# Patient Record
Sex: MALE | Race: WHITE | Employment: OTHER | URBAN - METROPOLITAN AREA
[De-identification: names, ages, dates, MRNs, and addresses within clinical notes are randomized per-mention and may not be internally consistent; named-entity substitution may affect disease eponyms.]

---

## 2019-11-29 ENCOUNTER — OFFICE VISIT (OUTPATIENT)
Dept: FAMILY MEDICINE CLINIC | Age: 59
End: 2019-11-29
Payer: COMMERCIAL

## 2019-11-29 VITALS
WEIGHT: 159 LBS | OXYGEN SATURATION: 97 % | BODY MASS INDEX: 24.1 KG/M2 | SYSTOLIC BLOOD PRESSURE: 118 MMHG | DIASTOLIC BLOOD PRESSURE: 76 MMHG | HEART RATE: 68 BPM | HEIGHT: 68 IN | TEMPERATURE: 98.6 F

## 2019-11-29 DIAGNOSIS — Z23 NEEDS FLU SHOT: ICD-10-CM

## 2019-11-29 DIAGNOSIS — J30.1 SEASONAL ALLERGIC RHINITIS DUE TO POLLEN: ICD-10-CM

## 2019-11-29 DIAGNOSIS — R20.8 SKIN PAIN: ICD-10-CM

## 2019-11-29 DIAGNOSIS — A63.0 GENITAL WARTS: ICD-10-CM

## 2019-11-29 DIAGNOSIS — Z12.5 SCREENING PSA (PROSTATE SPECIFIC ANTIGEN): ICD-10-CM

## 2019-11-29 DIAGNOSIS — K21.9 GASTROESOPHAGEAL REFLUX DISEASE WITHOUT ESOPHAGITIS: Primary | ICD-10-CM

## 2019-11-29 DIAGNOSIS — Z13.220 LIPID SCREENING: ICD-10-CM

## 2019-11-29 LAB
A/G RATIO: 1.9 (ref 1.1–2.2)
ALBUMIN SERPL-MCNC: 4.6 G/DL (ref 3.4–5)
ALP BLD-CCNC: 70 U/L (ref 40–129)
ALT SERPL-CCNC: 39 U/L (ref 10–40)
ANION GAP SERPL CALCULATED.3IONS-SCNC: 12 MMOL/L (ref 3–16)
AST SERPL-CCNC: 23 U/L (ref 15–37)
BILIRUB SERPL-MCNC: 1.2 MG/DL (ref 0–1)
BUN BLDV-MCNC: 22 MG/DL (ref 7–20)
CALCIUM SERPL-MCNC: 9.4 MG/DL (ref 8.3–10.6)
CHLORIDE BLD-SCNC: 99 MMOL/L (ref 99–110)
CHOLESTEROL, TOTAL: 185 MG/DL (ref 0–199)
CO2: 29 MMOL/L (ref 21–32)
CREAT SERPL-MCNC: 1.1 MG/DL (ref 0.9–1.3)
GFR AFRICAN AMERICAN: >60
GFR NON-AFRICAN AMERICAN: >60
GLOBULIN: 2.4 G/DL
GLUCOSE BLD-MCNC: 99 MG/DL (ref 70–99)
HDLC SERPL-MCNC: 50 MG/DL (ref 40–60)
LDL CHOLESTEROL CALCULATED: 91 MG/DL
POTASSIUM SERPL-SCNC: 4.5 MMOL/L (ref 3.5–5.1)
PROSTATE SPECIFIC ANTIGEN: 3.4 NG/ML (ref 0–4)
SODIUM BLD-SCNC: 140 MMOL/L (ref 136–145)
TOTAL PROTEIN: 7 G/DL (ref 6.4–8.2)
TRIGL SERPL-MCNC: 218 MG/DL (ref 0–150)
VLDLC SERPL CALC-MCNC: 44 MG/DL

## 2019-11-29 PROCEDURE — 17110 DESTRUCTION B9 LES UP TO 14: CPT | Performed by: FAMILY MEDICINE

## 2019-11-29 PROCEDURE — 99214 OFFICE O/P EST MOD 30 MIN: CPT | Performed by: FAMILY MEDICINE

## 2019-11-29 PROCEDURE — 36415 COLL VENOUS BLD VENIPUNCTURE: CPT | Performed by: FAMILY MEDICINE

## 2019-11-29 PROCEDURE — 90471 IMMUNIZATION ADMIN: CPT | Performed by: FAMILY MEDICINE

## 2019-11-29 PROCEDURE — 90688 IIV4 VACCINE SPLT 0.5 ML IM: CPT | Performed by: FAMILY MEDICINE

## 2019-11-29 ASSESSMENT — PATIENT HEALTH QUESTIONNAIRE - PHQ9
1. LITTLE INTEREST OR PLEASURE IN DOING THINGS: 0
SUM OF ALL RESPONSES TO PHQ9 QUESTIONS 1 & 2: 0
SUM OF ALL RESPONSES TO PHQ QUESTIONS 1-9: 0
2. FEELING DOWN, DEPRESSED OR HOPELESS: 0
SUM OF ALL RESPONSES TO PHQ QUESTIONS 1-9: 0

## 2019-12-05 ENCOUNTER — TELEPHONE (OUTPATIENT)
Dept: FAMILY MEDICINE CLINIC | Age: 59
End: 2019-12-05

## 2019-12-05 DIAGNOSIS — L98.9 SKIN LESION OF BACK: ICD-10-CM

## 2019-12-05 DIAGNOSIS — L98.9 SKIN LESION OF SCALP: Primary | ICD-10-CM

## 2019-12-06 ENCOUNTER — OFFICE VISIT (OUTPATIENT)
Dept: DERMATOLOGY | Age: 59
End: 2019-12-06
Payer: COMMERCIAL

## 2019-12-06 DIAGNOSIS — L57.8 PHOTOAGING OF SKIN: ICD-10-CM

## 2019-12-06 DIAGNOSIS — R23.8 VENOUS LAKE OF LIP: ICD-10-CM

## 2019-12-06 DIAGNOSIS — L57.0 ACTINIC KERATOSES: Primary | ICD-10-CM

## 2019-12-06 DIAGNOSIS — L82.1 SEBORRHEIC KERATOSIS: ICD-10-CM

## 2019-12-06 DIAGNOSIS — D22.9 MULTIPLE BENIGN MELANOCYTIC NEVI: ICD-10-CM

## 2019-12-06 PROCEDURE — 17000 DESTRUCT PREMALG LESION: CPT | Performed by: DERMATOLOGY

## 2019-12-06 PROCEDURE — 17003 DESTRUCT PREMALG LES 2-14: CPT | Performed by: DERMATOLOGY

## 2019-12-06 PROCEDURE — 99203 OFFICE O/P NEW LOW 30 MIN: CPT | Performed by: DERMATOLOGY

## 2021-11-30 RX ORDER — MELOXICAM 15 MG/1
TABLET ORAL
Qty: 30 TABLET | Refills: 0 | Status: SHIPPED | OUTPATIENT
Start: 2021-11-30 | End: 2021-12-14 | Stop reason: SDUPTHER

## 2021-11-30 NOTE — TELEPHONE ENCOUNTER
Patient called and he is requesting Meloxicam for his arthritis. It has been a while since he has had it.  He is scheduled to see you on 12-14-21

## 2021-12-14 ENCOUNTER — OFFICE VISIT (OUTPATIENT)
Dept: FAMILY MEDICINE CLINIC | Age: 61
End: 2021-12-14
Payer: COMMERCIAL

## 2021-12-14 VITALS
DIASTOLIC BLOOD PRESSURE: 80 MMHG | HEIGHT: 68 IN | HEART RATE: 75 BPM | WEIGHT: 171 LBS | OXYGEN SATURATION: 96 % | SYSTOLIC BLOOD PRESSURE: 119 MMHG | BODY MASS INDEX: 25.91 KG/M2

## 2021-12-14 DIAGNOSIS — Z23 NEEDS FLU SHOT: ICD-10-CM

## 2021-12-14 DIAGNOSIS — Z12.5 SCREENING PSA (PROSTATE SPECIFIC ANTIGEN): ICD-10-CM

## 2021-12-14 DIAGNOSIS — Z13.220 LIPID SCREENING: ICD-10-CM

## 2021-12-14 DIAGNOSIS — M19.041 PRIMARY OSTEOARTHRITIS OF BOTH HANDS: ICD-10-CM

## 2021-12-14 DIAGNOSIS — K21.9 GASTROESOPHAGEAL REFLUX DISEASE WITHOUT ESOPHAGITIS: Primary | ICD-10-CM

## 2021-12-14 DIAGNOSIS — M19.042 PRIMARY OSTEOARTHRITIS OF BOTH HANDS: ICD-10-CM

## 2021-12-14 PROCEDURE — 99214 OFFICE O/P EST MOD 30 MIN: CPT | Performed by: FAMILY MEDICINE

## 2021-12-14 PROCEDURE — 90471 IMMUNIZATION ADMIN: CPT | Performed by: FAMILY MEDICINE

## 2021-12-14 PROCEDURE — 90674 CCIIV4 VAC NO PRSV 0.5 ML IM: CPT | Performed by: FAMILY MEDICINE

## 2021-12-14 PROCEDURE — 36415 COLL VENOUS BLD VENIPUNCTURE: CPT | Performed by: FAMILY MEDICINE

## 2021-12-14 RX ORDER — MELOXICAM 15 MG/1
TABLET ORAL
Qty: 90 TABLET | Refills: 1 | Status: SHIPPED | OUTPATIENT
Start: 2021-12-14

## 2021-12-14 ASSESSMENT — PATIENT HEALTH QUESTIONNAIRE - PHQ9
SUM OF ALL RESPONSES TO PHQ QUESTIONS 1-9: 0
2. FEELING DOWN, DEPRESSED OR HOPELESS: 0
SUM OF ALL RESPONSES TO PHQ9 QUESTIONS 1 & 2: 0
SUM OF ALL RESPONSES TO PHQ QUESTIONS 1-9: 0
SUM OF ALL RESPONSES TO PHQ QUESTIONS 1-9: 0
1. LITTLE INTEREST OR PLEASURE IN DOING THINGS: 0

## 2021-12-14 NOTE — PROGRESS NOTES
Vaccine Information Sheet, \"Influenza - Inactivated\"  given to Shahrzad Champagne, or parent/legal guardian of  Shahrzad Champagne and verbalized understanding. Patient responses:    Have you ever had a reaction to a flu vaccine? No  Do you have any current illness? No  Have you ever had Gu[place] Syndrome? No  Do you have a serious allergy to any of the follow: Neomycin, Polymyxin, Thimerosal, eggs or egg products? No    Flu vaccine given per order. Please see immunization tab. Risks and benefits explained. Current VIS given.

## 2021-12-14 NOTE — PATIENT INSTRUCTIONS
Please read the healthy family handout that you were given and share it with your family. Please compare this printed medication list with your medications at home to be sure they are the same. If you have any medications that are different please contact us immediately at [phone]. Also review your allergies that we have listed, these may also include medications that you have not been able to tolerate, make sure everything listed is correct. If you have any allergies that are different please contact us immediately at [phone].

## 2021-12-14 NOTE — PROGRESS NOTES
He presents for follow up of his GERD and he has been having some arthritis type pains in his knees and hand joints. He states it comes and goes. He had some leftover meloxicam so he tried that and it worked great he does not take it every day just as needed he occasionally will take ibuprofen but not on the same day as meloxicam.  He continues to use over-the-counter Nexium and it works well for his GERD. He is due for blood work he states  Tests and documents reviewed: Medication list     Objective:   /80   Pulse 75   Ht 5' 7.5\" (1.715 m)   Wt 171 lb (77.6 kg)   SpO2 96%   BMI 26.39 kg/m²   BP Readings from Last 3 Encounters:   12/14/21 119/80   11/29/19 118/76   04/23/19 131/86     Physical Exam  Vitals reviewed. Constitutional:       Appearance: He is well-developed. He is not toxic-appearing. HENT:      Head: Normocephalic. Neck:      Thyroid: No thyroid mass or thyromegaly. Cardiovascular:      Rate and Rhythm: Normal rate and regular rhythm. Heart sounds: Normal heart sounds. No murmur heard. Pulmonary:      Effort: No respiratory distress. Breath sounds: Normal breath sounds. No wheezing or rales. Chest:   Breasts:      Right: No supraclavicular adenopathy. Abdominal:      General: There is no distension. Palpations: Abdomen is soft. There is no mass. Tenderness: There is no abdominal tenderness. There is no guarding or rebound. Musculoskeletal:      Cervical back: Neck supple. Lymphadenopathy:      Cervical: No cervical adenopathy. Upper Body:      Right upper body: No supraclavicular adenopathy. Skin:     General: Skin is warm. Neurological:      Mental Status: He is alert and oriented to person, place, and time. Psychiatric:         Behavior: Behavior normal.         Thought Content: Thought content normal.         Judgment: Judgment normal.       Assessment and Plan:   Diagnosis Orders   1.  Gastroesophageal reflux disease without esophagitis  Comprehensive Metabolic Panel   2. Primary osteoarthritis of both hands  meloxicam (MOBIC) 15 MG tablet    CBC Auto Differential    Sedimentation Rate   3. Lipid screening  Lipid Panel   4. Screening PSA (prostate specific antigen)  PSA screening   5. Needs flu shot  INFLUENZA, MDCK QUADV, 2 YRS AND OLDER, IM, PF, PREFILL SYR OR SDV, 0.5ML (FLUCELVAX QUADV, PF)   Use meloxicam as needed and avoid over-the-counter NSAIDs when taking it. They on Nexium since controlling reflux. He is attempted to go off of it and had rebound symptoms. We discussed Covid vaccination which she is already had. The problems listed in the assessment are stable unless otherwise indicated. He  was instructed to continue their current medications and treatment for the above problems unless otherwise indicated above. Age-specific preventative medicine recommendations were reviewed with patient today and the Healthy Family Handout was given to patient. Avoid tobacco products exposure. I have recommended that the patient follow CDC guidelines for prevention of COVID-19 infection. Follow up in 1 year. Call or return to office for any problems that develop before the next scheduled follow-up appointment. Saw Whittaker M.D. Parts of this note were completed using voice recognition transcription. Every effort was made to ensure accuracy; however, inadvertent transcription errors may be present.

## 2021-12-15 LAB
A/G RATIO: 1.9 (ref 1.1–2.2)
ALBUMIN SERPL-MCNC: 4.6 G/DL (ref 3.4–5)
ALP BLD-CCNC: 79 U/L (ref 40–129)
ALT SERPL-CCNC: 25 U/L (ref 10–40)
ANION GAP SERPL CALCULATED.3IONS-SCNC: 12 MMOL/L (ref 3–16)
AST SERPL-CCNC: 22 U/L (ref 15–37)
BASOPHILS ABSOLUTE: 0 K/UL (ref 0–0.2)
BASOPHILS RELATIVE PERCENT: 0.5 %
BILIRUB SERPL-MCNC: 1.3 MG/DL (ref 0–1)
BUN BLDV-MCNC: 25 MG/DL (ref 7–20)
CALCIUM SERPL-MCNC: 9 MG/DL (ref 8.3–10.6)
CHLORIDE BLD-SCNC: 101 MMOL/L (ref 99–110)
CHOLESTEROL, TOTAL: 193 MG/DL (ref 0–199)
CO2: 27 MMOL/L (ref 21–32)
CREAT SERPL-MCNC: 1.1 MG/DL (ref 0.8–1.3)
EOSINOPHILS ABSOLUTE: 0.2 K/UL (ref 0–0.6)
EOSINOPHILS RELATIVE PERCENT: 3.2 %
GFR AFRICAN AMERICAN: >60
GFR NON-AFRICAN AMERICAN: >60
GLUCOSE BLD-MCNC: 91 MG/DL (ref 70–99)
HCT VFR BLD CALC: 46.8 % (ref 40.5–52.5)
HDLC SERPL-MCNC: 44 MG/DL (ref 40–60)
HEMOGLOBIN: 16.3 G/DL (ref 13.5–17.5)
LDL CHOLESTEROL CALCULATED: 115 MG/DL
LYMPHOCYTES ABSOLUTE: 1.4 K/UL (ref 1–5.1)
LYMPHOCYTES RELATIVE PERCENT: 27.5 %
MCH RBC QN AUTO: 33.5 PG (ref 26–34)
MCHC RBC AUTO-ENTMCNC: 34.9 G/DL (ref 31–36)
MCV RBC AUTO: 96 FL (ref 80–100)
MONOCYTES ABSOLUTE: 0.4 K/UL (ref 0–1.3)
MONOCYTES RELATIVE PERCENT: 8.3 %
NEUTROPHILS ABSOLUTE: 3.2 K/UL (ref 1.7–7.7)
NEUTROPHILS RELATIVE PERCENT: 60.5 %
PDW BLD-RTO: 13.9 % (ref 12.4–15.4)
PLATELET # BLD: 182 K/UL (ref 135–450)
PMV BLD AUTO: 7 FL (ref 5–10.5)
POTASSIUM SERPL-SCNC: 4.5 MMOL/L (ref 3.5–5.1)
PROSTATE SPECIFIC ANTIGEN: 2.62 NG/ML (ref 0–4)
RBC # BLD: 4.87 M/UL (ref 4.2–5.9)
SEDIMENTATION RATE, ERYTHROCYTE: 3 MM/HR (ref 0–20)
SODIUM BLD-SCNC: 140 MMOL/L (ref 136–145)
TOTAL PROTEIN: 7 G/DL (ref 6.4–8.2)
TRIGL SERPL-MCNC: 170 MG/DL (ref 0–150)
VLDLC SERPL CALC-MCNC: 34 MG/DL
WBC # BLD: 5.3 K/UL (ref 4–11)

## 2023-02-20 ENCOUNTER — TELEPHONE (OUTPATIENT)
Dept: FAMILY MEDICINE CLINIC | Age: 63
End: 2023-02-20

## 2023-02-20 DIAGNOSIS — L98.9 SCALP LESION: Primary | ICD-10-CM

## 2023-02-20 NOTE — TELEPHONE ENCOUNTER
Wife calling for patient, she said he has a lesion on his scalp and she is wanting him to see dermatology, but need referral   Ok to send referral or do you need to see pt first?

## 2023-04-19 DIAGNOSIS — M19.041 PRIMARY OSTEOARTHRITIS OF BOTH HANDS: ICD-10-CM

## 2023-04-19 DIAGNOSIS — M19.042 PRIMARY OSTEOARTHRITIS OF BOTH HANDS: ICD-10-CM

## 2023-04-20 RX ORDER — MELOXICAM 15 MG/1
TABLET ORAL
Qty: 30 TABLET | Refills: 0 | Status: SHIPPED | OUTPATIENT
Start: 2023-04-20

## 2023-04-20 NOTE — TELEPHONE ENCOUNTER
Patient has not had office visit since 12/2021, I informed patient, he needed appt asap  In order to get refills.  We will refill for #30 with NO refills, patient is suppose to call and schedule before he runs out of this RX

## 2023-05-18 ENCOUNTER — OFFICE VISIT (OUTPATIENT)
Dept: FAMILY MEDICINE CLINIC | Age: 63
End: 2023-05-18

## 2023-05-18 VITALS
BODY MASS INDEX: 25.33 KG/M2 | SYSTOLIC BLOOD PRESSURE: 122 MMHG | TEMPERATURE: 98 F | HEIGHT: 69 IN | WEIGHT: 171 LBS | OXYGEN SATURATION: 96 % | HEART RATE: 65 BPM | DIASTOLIC BLOOD PRESSURE: 73 MMHG

## 2023-05-18 DIAGNOSIS — B07.9 VIRAL WARTS, UNSPECIFIED TYPE: ICD-10-CM

## 2023-05-18 DIAGNOSIS — M19.042 PRIMARY OSTEOARTHRITIS OF BOTH HANDS: ICD-10-CM

## 2023-05-18 DIAGNOSIS — Z00.00 WELL ADULT EXAM: Primary | ICD-10-CM

## 2023-05-18 DIAGNOSIS — M19.041 PRIMARY OSTEOARTHRITIS OF BOTH HANDS: ICD-10-CM

## 2023-05-18 DIAGNOSIS — Z12.5 SCREENING PSA (PROSTATE SPECIFIC ANTIGEN): ICD-10-CM

## 2023-05-18 DIAGNOSIS — Z23 NEED FOR SHINGLES VACCINE: ICD-10-CM

## 2023-05-18 LAB
DEPRECATED RDW RBC AUTO: 13.7 % (ref 12.4–15.4)
HCT VFR BLD AUTO: 48.5 % (ref 40.5–52.5)
HGB BLD-MCNC: 16.7 G/DL (ref 13.5–17.5)
MCH RBC QN AUTO: 33 PG (ref 26–34)
MCHC RBC AUTO-ENTMCNC: 34.3 G/DL (ref 31–36)
MCV RBC AUTO: 96.3 FL (ref 80–100)
PLATELET # BLD AUTO: 171 K/UL (ref 135–450)
PMV BLD AUTO: 7.7 FL (ref 5–10.5)
PSA SERPL DL<=0.01 NG/ML-MCNC: 2.82 NG/ML (ref 0–4)
RBC # BLD AUTO: 5.04 M/UL (ref 4.2–5.9)
WBC # BLD AUTO: 5.7 K/UL (ref 4–11)

## 2023-05-18 RX ORDER — MELOXICAM 15 MG/1
TABLET ORAL
Qty: 90 TABLET | Refills: 3 | Status: SHIPPED | OUTPATIENT
Start: 2023-05-18

## 2023-05-18 ASSESSMENT — PATIENT HEALTH QUESTIONNAIRE - PHQ9
SUM OF ALL RESPONSES TO PHQ QUESTIONS 1-9: 0
SUM OF ALL RESPONSES TO PHQ QUESTIONS 1-9: 0
2. FEELING DOWN, DEPRESSED OR HOPELESS: 0
SUM OF ALL RESPONSES TO PHQ QUESTIONS 1-9: 0
1. LITTLE INTEREST OR PLEASURE IN DOING THINGS: 0
SUM OF ALL RESPONSES TO PHQ QUESTIONS 1-9: 0
SUM OF ALL RESPONSES TO PHQ9 QUESTIONS 1 & 2: 0

## 2023-05-18 NOTE — PROGRESS NOTES
file    Years of education: Not on file    Highest education level: Not on file   Occupational History    Not on file   Tobacco Use    Smoking status: Never    Smokeless tobacco: Never   Vaping Use    Vaping Use: Never used   Substance and Sexual Activity    Alcohol use: Yes     Alcohol/week: 9.0 standard drinks     Types: 9 Cans of beer per week    Drug use: No    Sexual activity: Not on file   Other Topics Concern    Not on file   Social History Narrative    Not on file     Social Determinants of Health     Financial Resource Strain: Not on file   Food Insecurity: Not on file   Transportation Needs: Not on file   Physical Activity: Not on file   Stress: Not on file   Social Connections: Not on file   Intimate Partner Violence: Not on file   Housing Stability: Not on file        Family History   Problem Relation Age of Onset    Cancer Mother 48        colon cancer    Cancer Maternal Grandmother     Cancer Maternal Grandfather     Cancer Paternal Grandmother     Cancer Paternal Grandfather        ADVANCE DIRECTIVE: N, <no information>    Vitals:    05/18/23 0943   BP: 122/73   Pulse: 65   Temp: 98 °F (36.7 °C)   TempSrc: Oral   SpO2: 96%   Weight: 171 lb (77.6 kg)   Height: 5' 8.5\" (1.74 m)     Estimated body mass index is 25.62 kg/m² as calculated from the following:    Height as of this encounter: 5' 8.5\" (1.74 m). Weight as of this encounter: 171 lb (77.6 kg). Physical Exam  Constitutional:       General: He is not in acute distress. Appearance: He is well-developed. He is not toxic-appearing or diaphoretic. HENT:      Head: Normocephalic. Right Ear: Tympanic membrane, ear canal and external ear normal.      Left Ear: Tympanic membrane, ear canal and external ear normal.      Nose: No rhinorrhea. Eyes:      General: Lids are normal. No scleral icterus. Conjunctiva/sclera: Conjunctivae normal.      Pupils: Pupils are equal, round, and reactive to light.    Neck:      Thyroid: No thyroid mass

## 2023-05-18 NOTE — PATIENT INSTRUCTIONS
Please read the healthy family handout that you were given and share it with your family. Please compare this printed medication list with your medications at home to be sure they are the same. If you have any medications that are different please contact us immediately at [phone]. Also review your allergies that we have listed, these may also include medications that you have not been able to tolerate, make sure everything listed is correct. If you have any allergies that are different please contact us immediately at [phone]. You may receive a survey in the mail or by email asking about your experience during your visit today. Please complete and return to us so we know how we are serving you.

## 2023-05-19 LAB
ALBUMIN SERPL-MCNC: 4.6 G/DL (ref 3.4–5)
ALBUMIN/GLOB SERPL: 2 {RATIO} (ref 1.1–2.2)
ALP SERPL-CCNC: 76 U/L (ref 40–129)
ALT SERPL-CCNC: 24 U/L (ref 10–40)
ANION GAP SERPL CALCULATED.3IONS-SCNC: 13 MMOL/L (ref 3–16)
AST SERPL-CCNC: 21 U/L (ref 15–37)
BILIRUB SERPL-MCNC: 1.1 MG/DL (ref 0–1)
BUN SERPL-MCNC: 26 MG/DL (ref 7–20)
CALCIUM SERPL-MCNC: 9.1 MG/DL (ref 8.3–10.6)
CHLORIDE SERPL-SCNC: 103 MMOL/L (ref 99–110)
CHOLEST SERPL-MCNC: 187 MG/DL (ref 0–199)
CO2 SERPL-SCNC: 25 MMOL/L (ref 21–32)
CREAT SERPL-MCNC: 1 MG/DL (ref 0.8–1.3)
GFR SERPLBLD CREATININE-BSD FMLA CKD-EPI: >60 ML/MIN/{1.73_M2}
GLUCOSE SERPL-MCNC: 97 MG/DL (ref 70–99)
HDLC SERPL-MCNC: 51 MG/DL (ref 40–60)
LDLC SERPL CALC-MCNC: 108 MG/DL
POTASSIUM SERPL-SCNC: 4.7 MMOL/L (ref 3.5–5.1)
PROT SERPL-MCNC: 6.9 G/DL (ref 6.4–8.2)
SODIUM SERPL-SCNC: 141 MMOL/L (ref 136–145)
TRIGL SERPL-MCNC: 138 MG/DL (ref 0–150)
VLDLC SERPL CALC-MCNC: 28 MG/DL

## 2024-05-12 ENCOUNTER — TELEPHONE (OUTPATIENT)
Dept: FAMILY MEDICINE CLINIC | Age: 64
End: 2024-05-12

## 2024-05-12 RX ORDER — PREDNISONE 20 MG/1
40 TABLET ORAL DAILY
Qty: 10 TABLET | Refills: 0 | Status: SHIPPED | OUTPATIENT
Start: 2024-05-12 | End: 2024-05-17

## 2024-05-12 RX ORDER — METHOCARBAMOL 750 MG/1
750 TABLET, FILM COATED ORAL 4 TIMES DAILY
Qty: 40 TABLET | Refills: 0 | Status: SHIPPED | OUTPATIENT
Start: 2024-05-12 | End: 2024-05-22

## 2024-05-13 NOTE — TELEPHONE ENCOUNTER
Patient called on call provider with complaints of back pain.  Symptoms addressed.  Prescription sent to pharmacy.  Patient advised on following up if symptoms do not improve.

## 2024-05-17 DIAGNOSIS — M54.50 ACUTE MIDLINE LOW BACK PAIN, UNSPECIFIED WHETHER SCIATICA PRESENT: Primary | ICD-10-CM

## 2024-05-17 RX ORDER — LIDOCAINE 50 MG/G
1 PATCH TOPICAL DAILY
Qty: 10 PATCH | Refills: 0 | Status: SHIPPED | OUTPATIENT
Start: 2024-05-17 | End: 2024-05-27

## 2024-05-17 NOTE — PROGRESS NOTES
Patient contacted office with concerns of low back pain.  Patient had previously been prescribed muscle relaxants and steroids.  Patient thought previous lidocaine patches were still good however they have been  requesting new refill.  Refill sent to pharmacy.  Patient to follow-up as directed.

## 2024-06-04 DIAGNOSIS — M19.042 PRIMARY OSTEOARTHRITIS OF BOTH HANDS: ICD-10-CM

## 2024-06-04 DIAGNOSIS — M19.041 PRIMARY OSTEOARTHRITIS OF BOTH HANDS: ICD-10-CM

## 2024-06-04 RX ORDER — MELOXICAM 15 MG/1
TABLET ORAL
Qty: 90 TABLET | Refills: 3 | Status: SHIPPED | OUTPATIENT
Start: 2024-06-04

## 2024-07-09 PROBLEM — M51.26 DISC DISPLACEMENT, LUMBAR: Status: ACTIVE | Noted: 2024-07-09

## 2024-07-09 PROBLEM — M54.16 LUMBAR RADICULOPATHY: Status: ACTIVE | Noted: 2024-07-09

## 2025-04-28 ASSESSMENT — PATIENT HEALTH QUESTIONNAIRE - PHQ9
SUM OF ALL RESPONSES TO PHQ QUESTIONS 1-9: 0
1. LITTLE INTEREST OR PLEASURE IN DOING THINGS: NOT AT ALL
2. FEELING DOWN, DEPRESSED OR HOPELESS: NOT AT ALL
SUM OF ALL RESPONSES TO PHQ QUESTIONS 1-9: 0
SUM OF ALL RESPONSES TO PHQ9 QUESTIONS 1 & 2: 0
2. FEELING DOWN, DEPRESSED OR HOPELESS: NOT AT ALL
SUM OF ALL RESPONSES TO PHQ QUESTIONS 1-9: 0
SUM OF ALL RESPONSES TO PHQ QUESTIONS 1-9: 0
1. LITTLE INTEREST OR PLEASURE IN DOING THINGS: NOT AT ALL

## 2025-04-29 ENCOUNTER — OFFICE VISIT (OUTPATIENT)
Dept: FAMILY MEDICINE CLINIC | Age: 65
End: 2025-04-29
Payer: COMMERCIAL

## 2025-04-29 VITALS
OXYGEN SATURATION: 95 % | TEMPERATURE: 98.2 F | BODY MASS INDEX: 26.16 KG/M2 | SYSTOLIC BLOOD PRESSURE: 130 MMHG | HEART RATE: 80 BPM | DIASTOLIC BLOOD PRESSURE: 82 MMHG | WEIGHT: 167 LBS

## 2025-04-29 DIAGNOSIS — R16.0 LIVER MASS: ICD-10-CM

## 2025-04-29 DIAGNOSIS — Z12.5 SCREENING PSA (PROSTATE SPECIFIC ANTIGEN): ICD-10-CM

## 2025-04-29 DIAGNOSIS — M19.042 PRIMARY OSTEOARTHRITIS OF BOTH HANDS: ICD-10-CM

## 2025-04-29 DIAGNOSIS — Z00.00 WELL ADULT EXAM: Primary | ICD-10-CM

## 2025-04-29 DIAGNOSIS — M19.041 PRIMARY OSTEOARTHRITIS OF BOTH HANDS: ICD-10-CM

## 2025-04-29 PROCEDURE — 99396 PREV VISIT EST AGE 40-64: CPT | Performed by: FAMILY MEDICINE

## 2025-04-29 PROCEDURE — 36415 COLL VENOUS BLD VENIPUNCTURE: CPT | Performed by: FAMILY MEDICINE

## 2025-04-29 RX ORDER — MELOXICAM 15 MG/1
TABLET ORAL
Qty: 90 TABLET | Refills: 3 | Status: SHIPPED | OUTPATIENT
Start: 2025-04-29

## 2025-04-29 SDOH — ECONOMIC STABILITY: FOOD INSECURITY: WITHIN THE PAST 12 MONTHS, YOU WORRIED THAT YOUR FOOD WOULD RUN OUT BEFORE YOU GOT MONEY TO BUY MORE.: NEVER TRUE

## 2025-04-29 SDOH — ECONOMIC STABILITY: FOOD INSECURITY: WITHIN THE PAST 12 MONTHS, THE FOOD YOU BOUGHT JUST DIDN'T LAST AND YOU DIDN'T HAVE MONEY TO GET MORE.: NEVER TRUE

## 2025-04-29 NOTE — PROGRESS NOTES
2025    Adrián Rincon (:  1960) is a 64 y.o. male, here for a preventive medicine evaluation.  He would like to do fasting labs today.  He has a lump in his right upper quadrant which she did not notice.  He denies any pain or symptoms with it    Subjective   Patient Active Problem List   Diagnosis    GERD (gastroesophageal reflux disease)    Allergic rhinitis    Primary localized osteoarthrosis of shoulder region    Lumbar radiculopathy    Disc displacement, lumbar       Review of Systems  Positive for foot pain and arthritis pain otherwise negative  Prior to Visit Medications    Medication Sig Taking? Authorizing Provider   meloxicam (MOBIC) 15 MG tablet TAKE 1 TABLET BY MOUTH EVERY DAY Yes Edson Peng MD   NEXIUM 40 MG capsule TAKE 1 CAPSULE EVERY MORNING BEFORE BREAKFAST Yes Edosn Peng MD   fluticasone (FLONASE) 50 MCG/ACT nasal spray 2 sprays by Nasal route daily. Yes Edson Peng MD   Loratadine (CLARITIN PO) Take  by mouth. Yes Provider, MD Aditi        No Known Allergies    Past Medical History:   Diagnosis Date    Allergic rhinitis     Backache, unspecified     torn disc    Chest pain     cardiac eval neg by Dr Rincon    GERD (gastroesophageal reflux disease)     Lumbar radiculitis     Dr Benji GERARDO (postoperative nausea and vomiting)     Salmonella 2016    Syncope     with chest pain.        Past Surgical History:   Procedure Laterality Date    CHOLECYSTECTOMY  2013    at [facility]    COLONOSCOPY      polyps    COLONOSCOPY  2019    Normal, repeat in 5 years    HAND TENDON SURGERY Right [id number]    [facility]    UPPER GASTROINTESTINAL ENDOSCOPY  2012    gastric ulcer    UPPER GASTROINTESTINAL ENDOSCOPY  4/15/16    Dilitation       Social History     Socioeconomic History    Marital status:      Spouse name: Not on file    Number of children: Not on file    Years of education: Not on file    Highest education level: Not on

## 2025-04-29 NOTE — PATIENT INSTRUCTIONS
Please read the healthy family handout that you were given and share it with your family.       Please compare this printed medication list with your medications at home to be sure they are the same.  If you have any medications that are different please contact us immediately at [phone].     Also review your allergies that we have listed, these may also include medications that you have not been able to tolerate, make sure everything listed is correct. If you have any allergies that are different please contact us immediately at [phone].     You may receive a survey in the mail or by email asking about your experience during your visit today. Please complete and return to us so we know how we are serving you.

## 2025-04-30 LAB
ALBUMIN SERPL-MCNC: 4.5 G/DL (ref 3.4–5)
ALBUMIN/GLOB SERPL: 2 {RATIO} (ref 1.1–2.2)
ALP SERPL-CCNC: 73 U/L (ref 40–129)
ALT SERPL-CCNC: 24 U/L (ref 10–40)
ANION GAP SERPL CALCULATED.3IONS-SCNC: 11 MMOL/L (ref 3–16)
AST SERPL-CCNC: 21 U/L (ref 15–37)
BILIRUB SERPL-MCNC: 1.3 MG/DL (ref 0–1)
BUN SERPL-MCNC: 30 MG/DL (ref 7–20)
CALCIUM SERPL-MCNC: 8.9 MG/DL (ref 8.3–10.6)
CHLORIDE SERPL-SCNC: 105 MMOL/L (ref 99–110)
CHOLEST SERPL-MCNC: 173 MG/DL (ref 0–199)
CO2 SERPL-SCNC: 25 MMOL/L (ref 21–32)
CREAT SERPL-MCNC: 1.2 MG/DL (ref 0.8–1.3)
DEPRECATED RDW RBC AUTO: 13.1 % (ref 12.4–15.4)
GFR SERPLBLD CREATININE-BSD FMLA CKD-EPI: 68 ML/MIN/{1.73_M2}
GLUCOSE SERPL-MCNC: 97 MG/DL (ref 70–99)
HCT VFR BLD AUTO: 42.4 % (ref 40.5–52.5)
HDLC SERPL-MCNC: 44 MG/DL (ref 40–60)
HGB BLD-MCNC: 15.2 G/DL (ref 13.5–17.5)
LDLC SERPL CALC-MCNC: 107 MG/DL
MCH RBC QN AUTO: 33 PG (ref 26–34)
MCHC RBC AUTO-ENTMCNC: 35.9 G/DL (ref 31–36)
MCV RBC AUTO: 91.8 FL (ref 80–100)
PLATELET # BLD AUTO: 194 K/UL (ref 135–450)
PMV BLD AUTO: 7.7 FL (ref 5–10.5)
POTASSIUM SERPL-SCNC: 4.1 MMOL/L (ref 3.5–5.1)
PROT SERPL-MCNC: 6.8 G/DL (ref 6.4–8.2)
PSA SERPL DL<=0.01 NG/ML-MCNC: 2.66 NG/ML (ref 0–4)
RBC # BLD AUTO: 4.61 M/UL (ref 4.2–5.9)
SODIUM SERPL-SCNC: 141 MMOL/L (ref 136–145)
TRIGL SERPL-MCNC: 109 MG/DL (ref 0–150)
VLDLC SERPL CALC-MCNC: 22 MG/DL
WBC # BLD AUTO: 6.5 K/UL (ref 4–11)

## 2025-05-01 ENCOUNTER — RESULTS FOLLOW-UP (OUTPATIENT)
Dept: FAMILY MEDICINE CLINIC | Age: 65
End: 2025-05-01

## 2025-05-01 ENCOUNTER — HOSPITAL ENCOUNTER (OUTPATIENT)
Dept: ULTRASOUND IMAGING | Age: 65
Discharge: HOME | End: 2025-05-01
Payer: COMMERCIAL

## 2025-05-01 DIAGNOSIS — R16.0 LIVER MASS: ICD-10-CM

## 2025-05-01 PROCEDURE — 76705 ECHO EXAM OF ABDOMEN: CPT

## 2025-05-02 DIAGNOSIS — Z12.5 SCREENING PSA (PROSTATE SPECIFIC ANTIGEN): ICD-10-CM

## 2025-05-02 DIAGNOSIS — R16.0 LIVER MASS: ICD-10-CM

## 2025-05-02 DIAGNOSIS — Z00.00 WELL ADULT EXAM: Primary | ICD-10-CM

## 2025-05-06 ENCOUNTER — CLINICAL SUPPORT (OUTPATIENT)
Dept: FAMILY MEDICINE CLINIC | Age: 65
End: 2025-05-06
Payer: COMMERCIAL

## 2025-05-06 ENCOUNTER — RESULTS FOLLOW-UP (OUTPATIENT)
Dept: FAMILY MEDICINE CLINIC | Age: 65
End: 2025-05-06

## 2025-05-06 DIAGNOSIS — R16.0 LIVER MASS: ICD-10-CM

## 2025-05-06 DIAGNOSIS — Z00.00 WELL ADULT EXAM: ICD-10-CM

## 2025-05-06 DIAGNOSIS — Z12.5 SCREENING PSA (PROSTATE SPECIFIC ANTIGEN): ICD-10-CM

## 2025-05-06 LAB
CONTROL: NORMAL
FECAL BLOOD IMMUNOCHEMICAL TEST: NEGATIVE

## 2025-05-06 PROCEDURE — 82274 ASSAY TEST FOR BLOOD FECAL: CPT | Performed by: FAMILY MEDICINE
